# Patient Record
Sex: MALE | Race: WHITE | NOT HISPANIC OR LATINO
[De-identification: names, ages, dates, MRNs, and addresses within clinical notes are randomized per-mention and may not be internally consistent; named-entity substitution may affect disease eponyms.]

---

## 2017-01-03 ENCOUNTER — APPOINTMENT (OUTPATIENT)
Dept: ORTHOPEDIC SURGERY | Facility: CLINIC | Age: 42
End: 2017-01-03

## 2017-01-03 VITALS
TEMPERATURE: 98.4 F | SYSTOLIC BLOOD PRESSURE: 137 MMHG | HEIGHT: 68 IN | WEIGHT: 235 LBS | BODY MASS INDEX: 35.61 KG/M2 | DIASTOLIC BLOOD PRESSURE: 86 MMHG | HEART RATE: 93 BPM

## 2017-01-03 DIAGNOSIS — Z82.62 FAMILY HISTORY OF OSTEOPOROSIS: ICD-10-CM

## 2017-01-03 DIAGNOSIS — S43.401A UNSPECIFIED SPRAIN OF RIGHT SHOULDER JOINT, INITIAL ENCOUNTER: ICD-10-CM

## 2017-01-03 DIAGNOSIS — R73.03 PREDIABETES.: ICD-10-CM

## 2017-01-09 ENCOUNTER — FORM ENCOUNTER (OUTPATIENT)
Age: 42
End: 2017-01-09

## 2017-01-10 ENCOUNTER — OUTPATIENT (OUTPATIENT)
Dept: OUTPATIENT SERVICES | Facility: HOSPITAL | Age: 42
LOS: 1 days | End: 2017-01-10

## 2017-01-10 ENCOUNTER — APPOINTMENT (OUTPATIENT)
Dept: MRI IMAGING | Facility: CLINIC | Age: 42
End: 2017-01-10

## 2017-01-10 DIAGNOSIS — Z00.8 ENCOUNTER FOR OTHER GENERAL EXAMINATION: ICD-10-CM

## 2017-06-18 ENCOUNTER — EMERGENCY (EMERGENCY)
Facility: HOSPITAL | Age: 42
LOS: 1 days | Discharge: HOME | End: 2017-06-18
Attending: EMERGENCY MEDICINE
Payer: MEDICAID

## 2017-06-18 ENCOUNTER — EMERGENCY (EMERGENCY)
Facility: HOSPITAL | Age: 42
LOS: 1 days | Discharge: HOME | End: 2017-06-18

## 2017-06-18 VITALS
OXYGEN SATURATION: 97 % | DIASTOLIC BLOOD PRESSURE: 96 MMHG | SYSTOLIC BLOOD PRESSURE: 146 MMHG | WEIGHT: 229.94 LBS | TEMPERATURE: 99 F | RESPIRATION RATE: 20 BRPM | HEIGHT: 69 IN | HEART RATE: 97 BPM

## 2017-06-18 PROCEDURE — 99283 EMERGENCY DEPT VISIT LOW MDM: CPT

## 2017-06-18 RX ORDER — HYDROCORTISONE 1 %
1 OINTMENT (GRAM) TOPICAL
Qty: 1 | Refills: 0 | OUTPATIENT
Start: 2017-06-18 | End: 2017-07-02

## 2017-06-18 RX ADMIN — Medication 60 MILLIGRAM(S): at 14:49

## 2017-06-18 NOTE — ED STATDOCS - MEDICAL DECISION MAKING DETAILS
Will treat pt for superficial dermitis. Pt was also explained that this may be a fungal infection. Pt will f/u with dermatologist.

## 2017-06-18 NOTE — ED STATDOCS - GENITOURINARY, MLM
normal... Pelvic exam: Erythema noted to a small amount of the scrotum and skin excoriation. Erythema does not extend to the perineal. No penile discharge. Small erythematous non-vesicular papular lesion on the foreskin. Chaperone: Sadia Oh (scribe)

## 2017-06-18 NOTE — ED STATDOCS - OBJECTIVE STATEMENT
40 y/o M presents to ED c/o redness over R eyelid and lips x3 days. Pt states the rash spread to his groin and penis as well. Pt notes inflammation on his R eyelid and "little bumps" on his skin that are itchy. Pt has been going to the gym until about 3 days ago but reports engaging in rigorous physical activity in his home recently causing sweating within the last 3 days. Pt attempted relief with Fluticasone on the first day his sx onset to no relief. He states he put Mupirocin a few hours ago which helped to minimally resolve itching. He has had a hx of similar sx with a fungal infection on his foreskin and was Rx Mupirocin and a hx of cellulitis on his armpit. Pt states he has not seen his dermatologist for his current sx. Denies hx of STD/STI, fever, chills, pain or any other complaints at this time. Pt is pre-diabetic and pre-hypertensive.

## 2018-01-01 ENCOUNTER — EMERGENCY (EMERGENCY)
Facility: HOSPITAL | Age: 43
LOS: 1 days | Discharge: HOME | End: 2018-01-01
Attending: EMERGENCY MEDICINE
Payer: MEDICAID

## 2018-01-01 VITALS
HEIGHT: 68 IN | RESPIRATION RATE: 16 BRPM | OXYGEN SATURATION: 96 % | DIASTOLIC BLOOD PRESSURE: 84 MMHG | SYSTOLIC BLOOD PRESSURE: 154 MMHG | WEIGHT: 229.94 LBS | TEMPERATURE: 100 F | HEART RATE: 100 BPM

## 2018-01-01 PROCEDURE — 99282 EMERGENCY DEPT VISIT SF MDM: CPT

## 2018-01-01 PROCEDURE — 99283 EMERGENCY DEPT VISIT LOW MDM: CPT

## 2018-01-01 NOTE — ED ADULT TRIAGE NOTE - CHIEF COMPLAINT QUOTE
pt reports fever/body aches. father had flu and was on tamiflu. states he took 2 doses of his dads tamiflu.

## 2018-01-01 NOTE — ED STATDOCS - OBJECTIVE STATEMENT
42 year old male presenting to the ED complaining of a fever. He states that he developed a fever of 102F. Pt states that his father was recently dx the flu and the pt feels that he may have caught it from him. He states that he began to take the Tamiflu that was prescribed for his father, which he has been taking for 2 days. He denies having any abdominal pain, nausea, or decreased appetite. Pt states that he has PMHx of DM and is not on medication. No further complaints at this time.

## 2018-07-22 PROBLEM — R73.03 PREDIABETES: Status: RESOLVED | Noted: 2017-01-03 | Resolved: 2018-07-22

## 2019-01-09 ENCOUNTER — EMERGENCY (EMERGENCY)
Facility: HOSPITAL | Age: 44
LOS: 1 days | Discharge: HOME | End: 2019-01-09
Attending: EMERGENCY MEDICINE
Payer: MEDICAID

## 2019-01-09 VITALS
SYSTOLIC BLOOD PRESSURE: 155 MMHG | WEIGHT: 214.95 LBS | HEIGHT: 68 IN | TEMPERATURE: 99 F | OXYGEN SATURATION: 97 % | RESPIRATION RATE: 18 BRPM | DIASTOLIC BLOOD PRESSURE: 106 MMHG | HEART RATE: 124 BPM

## 2019-01-09 VITALS
DIASTOLIC BLOOD PRESSURE: 65 MMHG | TEMPERATURE: 98 F | RESPIRATION RATE: 16 BRPM | OXYGEN SATURATION: 98 % | SYSTOLIC BLOOD PRESSURE: 134 MMHG | HEART RATE: 80 BPM

## 2019-01-09 PROBLEM — E11.9 TYPE 2 DIABETES MELLITUS WITHOUT COMPLICATIONS: Chronic | Status: ACTIVE | Noted: 2018-01-01

## 2019-01-09 LAB
ALBUMIN SERPL ELPH-MCNC: 4.4 G/DL — SIGNIFICANT CHANGE UP (ref 3.3–5.2)
ALP SERPL-CCNC: 90 U/L — SIGNIFICANT CHANGE UP (ref 40–120)
ALT FLD-CCNC: 53 U/L — HIGH
ANION GAP SERPL CALC-SCNC: 17 MMOL/L — SIGNIFICANT CHANGE UP (ref 5–17)
APTT BLD: 30.9 SEC — SIGNIFICANT CHANGE UP (ref 27.5–36.3)
AST SERPL-CCNC: 37 U/L — SIGNIFICANT CHANGE UP
BASOPHILS # BLD AUTO: 0 K/UL — SIGNIFICANT CHANGE UP (ref 0–0.2)
BASOPHILS NFR BLD AUTO: 0.7 % — SIGNIFICANT CHANGE UP (ref 0–2)
BILIRUB SERPL-MCNC: 1.3 MG/DL — SIGNIFICANT CHANGE UP (ref 0.4–2)
BUN SERPL-MCNC: 12 MG/DL — SIGNIFICANT CHANGE UP (ref 8–20)
CALCIUM SERPL-MCNC: 8.7 MG/DL — SIGNIFICANT CHANGE UP (ref 8.6–10.2)
CHLORIDE SERPL-SCNC: 92 MMOL/L — LOW (ref 98–107)
CO2 SERPL-SCNC: 21 MMOL/L — LOW (ref 22–29)
CREAT SERPL-MCNC: 0.65 MG/DL — SIGNIFICANT CHANGE UP (ref 0.5–1.3)
EOSINOPHIL # BLD AUTO: 0.2 K/UL — SIGNIFICANT CHANGE UP (ref 0–0.5)
EOSINOPHIL NFR BLD AUTO: 3.4 % — SIGNIFICANT CHANGE UP (ref 0–5)
GLUCOSE SERPL-MCNC: 396 MG/DL — HIGH (ref 70–115)
HCT VFR BLD CALC: 48.4 % — SIGNIFICANT CHANGE UP (ref 42–52)
HGB BLD-MCNC: 17 G/DL — SIGNIFICANT CHANGE UP (ref 14–18)
INR BLD: 0.9 RATIO — SIGNIFICANT CHANGE UP (ref 0.88–1.16)
LYMPHOCYTES # BLD AUTO: 1.3 K/UL — SIGNIFICANT CHANGE UP (ref 1–4.8)
LYMPHOCYTES # BLD AUTO: 29.9 % — SIGNIFICANT CHANGE UP (ref 20–55)
MCHC RBC-ENTMCNC: 31.1 PG — SIGNIFICANT CHANGE UP (ref 27–31)
MCHC RBC-ENTMCNC: 38 G/DL — SIGNIFICANT CHANGE UP (ref 32–36)
MCV RBC AUTO: 81.8 FL — SIGNIFICANT CHANGE UP (ref 80–94)
MONOCYTES # BLD AUTO: 0.6 K/UL — SIGNIFICANT CHANGE UP (ref 0–0.8)
MONOCYTES NFR BLD AUTO: 13.3 % — HIGH (ref 3–10)
NEUTROPHILS # BLD AUTO: 2.3 K/UL — SIGNIFICANT CHANGE UP (ref 1.8–8)
NEUTROPHILS NFR BLD AUTO: 52 % — SIGNIFICANT CHANGE UP (ref 37–73)
PLATELET # BLD AUTO: 182 K/UL — SIGNIFICANT CHANGE UP (ref 150–400)
POTASSIUM SERPL-MCNC: 3.7 MMOL/L — SIGNIFICANT CHANGE UP (ref 3.5–5.3)
POTASSIUM SERPL-SCNC: 3.7 MMOL/L — SIGNIFICANT CHANGE UP (ref 3.5–5.3)
PROT SERPL-MCNC: 6.8 G/DL — SIGNIFICANT CHANGE UP (ref 6.6–8.7)
PROTHROM AB SERPL-ACNC: 10.3 SEC — SIGNIFICANT CHANGE UP (ref 10–12.9)
RBC # BLD: 5.88 M/UL — SIGNIFICANT CHANGE UP (ref 4.6–6.2)
RBC # FLD: 13.2 % — SIGNIFICANT CHANGE UP (ref 11–15.6)
SODIUM SERPL-SCNC: 130 MMOL/L — LOW (ref 135–145)
TROPONIN T SERPL-MCNC: <0.01 NG/ML — SIGNIFICANT CHANGE UP (ref 0–0.06)
WBC # BLD: 4.4 K/UL — LOW (ref 4.8–10.8)
WBC # FLD AUTO: 4.4 K/UL — LOW (ref 4.8–10.8)

## 2019-01-09 PROCEDURE — 93005 ELECTROCARDIOGRAM TRACING: CPT

## 2019-01-09 PROCEDURE — 99281 EMR DPT VST MAYX REQ PHY/QHP: CPT | Mod: 25

## 2019-01-09 PROCEDURE — 70450 CT HEAD/BRAIN W/O DYE: CPT | Mod: 26,59

## 2019-01-09 PROCEDURE — 96374 THER/PROPH/DIAG INJ IV PUSH: CPT | Mod: XU

## 2019-01-09 PROCEDURE — 85730 THROMBOPLASTIN TIME PARTIAL: CPT

## 2019-01-09 PROCEDURE — 70551 MRI BRAIN STEM W/O DYE: CPT | Mod: 26

## 2019-01-09 PROCEDURE — 80053 COMPREHEN METABOLIC PANEL: CPT

## 2019-01-09 PROCEDURE — 70551 MRI BRAIN STEM W/O DYE: CPT

## 2019-01-09 PROCEDURE — 85027 COMPLETE CBC AUTOMATED: CPT

## 2019-01-09 PROCEDURE — 70496 CT ANGIOGRAPHY HEAD: CPT | Mod: 26

## 2019-01-09 PROCEDURE — 93010 ELECTROCARDIOGRAM REPORT: CPT

## 2019-01-09 PROCEDURE — 70496 CT ANGIOGRAPHY HEAD: CPT

## 2019-01-09 PROCEDURE — 84484 ASSAY OF TROPONIN QUANT: CPT

## 2019-01-09 PROCEDURE — 85610 PROTHROMBIN TIME: CPT

## 2019-01-09 PROCEDURE — 99291 CRITICAL CARE FIRST HOUR: CPT

## 2019-01-09 PROCEDURE — 70498 CT ANGIOGRAPHY NECK: CPT | Mod: 26

## 2019-01-09 PROCEDURE — 70498 CT ANGIOGRAPHY NECK: CPT

## 2019-01-09 PROCEDURE — 70450 CT HEAD/BRAIN W/O DYE: CPT

## 2019-01-09 PROCEDURE — 82962 GLUCOSE BLOOD TEST: CPT

## 2019-01-09 PROCEDURE — 36415 COLL VENOUS BLD VENIPUNCTURE: CPT

## 2019-01-09 RX ORDER — VALACYCLOVIR 500 MG/1
2 TABLET, FILM COATED ORAL
Qty: 42 | Refills: 0 | OUTPATIENT
Start: 2019-01-09 | End: 2019-01-15

## 2019-01-09 RX ORDER — PANTOPRAZOLE SODIUM 20 MG/1
1 TABLET, DELAYED RELEASE ORAL
Qty: 7 | Refills: 0 | OUTPATIENT
Start: 2019-01-09 | End: 2019-01-15

## 2019-01-09 RX ORDER — VALACYCLOVIR 500 MG/1
1000 TABLET, FILM COATED ORAL ONCE
Qty: 0 | Refills: 0 | Status: COMPLETED | OUTPATIENT
Start: 2019-01-09 | End: 2019-01-09

## 2019-01-09 RX ORDER — METOPROLOL TARTRATE 50 MG
5 TABLET ORAL ONCE
Qty: 0 | Refills: 0 | Status: COMPLETED | OUTPATIENT
Start: 2019-01-09 | End: 2019-01-09

## 2019-01-09 RX ADMIN — Medication 60 MILLIGRAM(S): at 16:38

## 2019-01-09 RX ADMIN — VALACYCLOVIR 1000 MILLIGRAM(S): 500 TABLET, FILM COATED ORAL at 16:38

## 2019-01-09 RX ADMIN — Medication 5 MILLIGRAM(S): at 11:42

## 2019-01-09 NOTE — ED PROVIDER NOTE - ENMT, MLM
Airway patent, Nasal mucosa clear. Mouth with normal mucosa. Throat has no vesicles, no oropharyngeal exudates and uvula is midline. Airway patent, Nasal mucosa clear. Mouth with normal mucosa. Throat has no vesicles, no oropharyngeal exudates and uvula is midline. TMs clear b/l with open comedome noted in the left ear canal, no drainage or surrounding cellulitis.

## 2019-01-09 NOTE — ED PROVIDER NOTE - ATTENDING CONTRIBUTION TO CARE
SEEN WITH RESIDENT. ORIGINALLY CODE STROKE HOWEVER HX AND SYMPTOM MORE C/W BELSS PALSY/ TELESTROKE MD BONNER THOUGHT SHE SAW SOMETHING IN THE MEDULLA. MR AND CTA HEAD/NECK PERFORMED. NO STROKE OR OTHER ABNORMALITY SEEN  PT STARTED ON STEROID, ANTIVIRAL, D/C TO NEURO FU AS OUTPT  REC TO TREAT DM AND HTN WITH PCP  AGREE WITH HX PE TX DISPO

## 2019-01-09 NOTE — ED PROVIDER NOTE - OBJECTIVE STATEMENT
43M with hx of DM and hypertriglyceridemia p/w left facial paralysis since yesterday morning. patient states sx started around his mouth and then this morning started to involve his eye lid. feels he is having difficulty moving the side of his mouth and swallowing a bit as well as closing his left eye. (+) recent left ear discomfort last week. denies any visual changes, slurred speech, fever, chills. does admit to not seeking treatment for his DM and HyperTG, trying to control with diet and exercise.

## 2019-01-09 NOTE — ED PROVIDER NOTE - PROGRESS NOTE DETAILS
POSSIBLE STROKE  DO CT SCAN WITHOUT LABS  RISK ELVO (POSSIBLE FATAL)GREATER THAN RISK KIDNEY DAMAGE Itzel: all imaging negative for cva. will treat for bells palsy. d/w patient. outpt neuro f/u.

## 2019-01-09 NOTE — ED ADULT NURSE NOTE - OBJECTIVE STATEMENT
PAtient c/o of waking up 1/8/19 with a headache ,difficulty smiling , facial droop to left side and dry mouth, pt stated that he did not seek medical attention yesterday because "he felt it would get better"

## 2019-01-09 NOTE — ED PROVIDER NOTE - MEDICAL DECISION MAKING DETAILS
43M with left facial droop since yesterday. code stroke called and stat head ct preformed. code stroke cancelled secondary to time window. tele neuro eval

## 2019-01-09 NOTE — ED ADULT TRIAGE NOTE - CHIEF COMPLAINT QUOTE
Pt presents to ED for eval of left sided facial droop since yesterday at 8am. Pt states that he began having numbness and then proceeded to inability to close his left eye. COde stroke called after eval by Dr. Sanchez.

## 2019-01-09 NOTE — ED ADULT NURSE NOTE - NSIMPLEMENTINTERV_GEN_ALL_ED
Implemented All Universal Safety Interventions:  [place] to call system. Call bell, personal items and telephone within reach. Instruct patient to call for assistance. Room bathroom lighting operational. Non-slip footwear when patient is off stretcher. Physically safe environment: no spills, clutter or unnecessary equipment. Stretcher in lowest position, wheels locked, appropriate side rails in place.

## 2019-01-09 NOTE — ED ADULT NURSE REASSESSMENT NOTE - NS ED NURSE REASSESS COMMENT FT1
Patient remains pending results of MRI aware of NPO status, currently comfortable denies c/o of discomfort at this time vital signs remain stable NIH remains 1; POC reinforced with patient will continue to monitor patient

## 2019-01-09 NOTE — ED PROVIDER NOTE - CARE PROVIDER_API CALL
Darron Altman (MD; PhD), Clinical Neurophysiology; Neurology  [address] [place] [address]  Phone: [phone]  Fax: [phone]

## 2019-01-09 NOTE — ED STATDOCS - NS_ ATTENDINGSCRIBEDETAILS _ED_A_ED_FT
I, Oneal Sanchez, performed the initial face to face bedside interview with this patient regarding history of present illness, review of symptoms and relevant past medical, social and family history.  I completed an independent physical examination.    The history, relevant review of systems, past medical and surgical history, medical decision making, and physical examination was documented by the scribe in my presence and I attest to the accuracy of the documentation.

## 2019-01-09 NOTE — ED PROVIDER NOTE - CRITICAL CARE PROVIDED
60 MINUTES/direct patient care (not related to procedure)/additional history taking/interpretation of diagnostic studies/consultation with other physicians/documentation

## 2019-11-04 NOTE — ED PROVIDER NOTE - NS ED ATTENDING STATEMENT MOD
Patient is requesting a call back. He states that he is having some issues with his foot.     Thank you    Attending Only I have personally seen and examined this patient.  I have fully participated in the care of this patient. I have reviewed all pertinent clinical information, including history, physical exam, plan and the Resident’s note and agree except as noted.

## 2021-07-20 ENCOUNTER — NON-APPOINTMENT (OUTPATIENT)
Age: 46
End: 2021-07-20

## 2021-07-20 ENCOUNTER — APPOINTMENT (OUTPATIENT)
Dept: ORTHOPEDIC SURGERY | Facility: CLINIC | Age: 46
End: 2021-07-20
Payer: MEDICAID

## 2021-07-20 PROCEDURE — 99204 OFFICE O/P NEW MOD 45 MIN: CPT

## 2021-07-23 NOTE — ADDENDUM
[FreeTextEntry1] : Documented by Rogerio Vega acting as a scribe for Dr. Ignacio on 07/20/2021. \par \par All medical record entries made by the Scribe were at my, Dr. Ignacio's, direction and\par personally dictated by me on 07/20/2021. I have reviewed the chart and agree that the record\par accurately reflects my personal performance of the history, physical exam, procedure and imaging.

## 2021-07-23 NOTE — HISTORY OF PRESENT ILLNESS
[Stable] : stable [___ yrs] : [unfilled] year(s) ago [2] : a current pain level of 2/10 [3] : an average pain level of 3/10 [Lifting] : worsened by lifting [de-identified] : RANDALL MARIA is a 45 year male being seen for initial visit R forearm pain. He reports SHARON as performing incline bench presses at the gym when the rep got too heavy and he set the weight down forward instead of backward. He visited [facility] after injury, where they provided him with wrong brace. Then after he was called back and provided with right brace, and mentioned cortisone injection. However, due to COVID he wasn’t able to get appointment. He denies any mechanical symptoms such as clicking or popping. He does endorses tightness and mild pain with flexion and extension.

## 2021-07-23 NOTE — PHYSICAL EXAM
[de-identified] : Physical Exam:\par General: Well appearing, no acute distress\par Neurologic: A&Ox3, No focal deficits\par Head: NCAT without abrasions, lacerations, or ecchymosis to head, face, or scalp\par Eyes: No scleral icterus, no gross abnormalities\par Respiratory: Equal chest wall expansion bilaterally, no accessory muscle use\par Lymphatic: No lymphadenopathy palpated\par Skin: Warm and dry\par Psychiatric: Normal mood and affect \par \par Right wrist exam\par Skin: Clean, dry, intact. No ecchymosis. No swelling. Palpable gap noted at extensor tendon compartment 3. No audible clicking. No crepitus. TTP extensor tendon. \par ROM: RIGHT  mild pain with pronation and no pain with supination,  LEFT full supination/pronation.\par Painful ROM: None\par Tenderness: No tenderness to palpation at the distal radius, distal ulna, the DRUJ. No pain at the scapholunate interval. No snuffbox pain.\par Strength: 4/5 wrist flexion, 4/5 wrist extension, 5/5 supination, 4/5 pronation\par Stability: Stable DRUJ \par Vasc: 2+ radial pulse, <2s cap refill\par Sensation: In tact to light touch throughout\par Neuro: Negative tinels over median nerve, AIN/PIN/Ulnar nerve in tact to motor/sensation.

## 2021-07-23 NOTE — DISCUSSION/SUMMARY
[de-identified] : RANDALL MARIA is a 45 year male being seen for initial visit R forearm pain. He reports SHARON as performing incline bench presses at the gym when the rep got too heavy and he set the weight down forward instead of backward. He visited [facility] after injury, where they provided him with wrong brace. Then after he was called back and provided with right brace, and mentioned cortisone injection. However, due to COVID he wasn’t able to get appointment. He denies any mechanical symptoms such as clicking or popping. He does endorses tightness and mild pain with flexion and extension.\par \par We had a thorough discussion regarding the nature of his pain, the pathophysiology, as well as all treatment options. Considering the patient's current presentation of pain being worse than prior and failing on greater than 3 months of conservative measures, an MRI is indicated at this time. A prescription for this was given to the patient. We will go over the MRI results with him upon obtaining the results in the office and advise him of further treatment options or refer him to Dr. Lewis. He agrees with the above plan and all questions were answered.

## 2021-07-29 ENCOUNTER — APPOINTMENT (OUTPATIENT)
Dept: MRI IMAGING | Facility: CLINIC | Age: 46
End: 2021-07-29
Payer: MEDICAID

## 2021-07-29 ENCOUNTER — OUTPATIENT (OUTPATIENT)
Dept: OUTPATIENT SERVICES | Facility: HOSPITAL | Age: 46
LOS: 1 days | End: 2021-07-29

## 2021-07-29 DIAGNOSIS — S66.212S: ICD-10-CM

## 2021-07-29 PROCEDURE — 73218 MRI UPPER EXTREMITY W/O DYE: CPT | Mod: 26,RT

## 2021-07-29 PROCEDURE — 73221 MRI JOINT UPR EXTREM W/O DYE: CPT | Mod: 26,RT

## 2021-08-06 ENCOUNTER — NON-APPOINTMENT (OUTPATIENT)
Age: 46
End: 2021-08-06

## 2021-08-09 ENCOUNTER — APPOINTMENT (OUTPATIENT)
Dept: ORTHOPEDIC SURGERY | Facility: CLINIC | Age: 46
End: 2021-08-09
Payer: MEDICAID

## 2021-08-09 ENCOUNTER — APPOINTMENT (OUTPATIENT)
Dept: ORTHOPEDIC SURGERY | Facility: CLINIC | Age: 46
End: 2021-08-09

## 2021-08-09 VITALS
HEART RATE: 106 BPM | BODY MASS INDEX: 31.52 KG/M2 | SYSTOLIC BLOOD PRESSURE: 165 MMHG | HEIGHT: 68 IN | WEIGHT: 208 LBS | DIASTOLIC BLOOD PRESSURE: 97 MMHG

## 2021-08-09 DIAGNOSIS — S66.212S: ICD-10-CM

## 2021-08-09 DIAGNOSIS — M25.511 PAIN IN RIGHT SHOULDER: ICD-10-CM

## 2021-08-09 PROCEDURE — 99214 OFFICE O/P EST MOD 30 MIN: CPT | Mod: 25

## 2021-08-09 PROCEDURE — 20610 DRAIN/INJ JOINT/BURSA W/O US: CPT | Mod: RT

## 2021-08-09 NOTE — ADDENDUM
[FreeTextEntry1] : Documented by Rogerio Vega acting as a scribe for Dr. Ignacio on 08/09/2021. \par \par All medical record entries made by the Scribe were at my, Dr. Ignacio's, direction and\par personally dictated by me on 08/09/2021. I have reviewed the chart and agree that the record\par accurately reflects my personal performance of the history, physical exam, procedure and imaging.

## 2021-08-09 NOTE — DISCUSSION/SUMMARY
[de-identified] : RANDALL MARIA is a 45 year male being seen for f/u visit R forearm pain. He presents for MRI review of R forearm and wrist, and is requesting cortisone injection. \par \par We had a thorough discussion regarding the nature of his pain, the pathophysiology, as well as all treatment options. Pt due to his acute pain elected for a corticosteroid injection at today's visit and tolerated the procedure well. He should take it easy for the next 2-3 days while icing the joint. Patient will follow up in 6-8 wks for repeat clinical assessment. If refractory, we will refer him to Dr. Lewis to explore further treatment options. All questions were answered and the patient verbalized understanding. The patient is in agreement with this treatment plan.

## 2021-08-09 NOTE — PROCEDURE
[de-identified] : Injection: Right forearm \par Indication: Pain \par \par A discussion was had with the patient regarding this procedure and all questions were answered. All risks, benefits and alternatives were discussed. These included but were not limited to bleeding, infection, and allergic reaction. Alcohol was used to clean the skin, and betadine was used to sterilize and prep the area in the anterior aspect of the right forearm. Ethyl chloride spray was then used as a topical anesthetic. A 22-gauge needle was used to inject 3cc 1% xylocaine, 2cc 0.25% bupivacaine and 1cc of 40mg/mL triamcinolone acetonide into the right forearm. A sterile bandage was then applied. The patient tolerated the procedure well and there were no complications. \par

## 2021-08-09 NOTE — HISTORY OF PRESENT ILLNESS
[Stable] : stable [___ yrs] : [unfilled] year(s) ago [2] : a current pain level of 2/10 [3] : an average pain level of 3/10 [Lifting] : worsened by lifting [de-identified] : RANDALL MARIA is a 45 year male being seen for f/u visit R forearm pain. He presents for MRI review of R forearm and wrist, and is requesting cortisone injection. \par \par MRI of R forearm reveals:\par \par *  Longitudinal split tear of the extensor tendon within the fourth extensor compartment extending towards the index finger. Suggestion of low-grade intrasubstance tearing of the extensor tendons within the fourth extensor compartment extending towards the ring and little finger. No full-thickness tear.\par *  Small amount of fluid within the medial aspect of the flexor retinaculum. No flexor tendon tear.\par *  High-grade partial-thickness tear of the origin of the common extensor tendon at the level of the elbow.\par *  Nonspecific edema within the proximal radial shaft, which may represent stress reaction. Biceps insertion is unremarkable.\par *  Focal perforation of the radial attachment of the triangular fibrocartilage complex.\par \par

## 2021-08-09 NOTE — PHYSICAL EXAM
[de-identified] : Physical Exam:\par General: Well appearing, no acute distress\par Neurologic: A&Ox3, No focal deficits\par Head: NCAT without abrasions, lacerations, or ecchymosis to head, face, or scalp\par Eyes: No scleral icterus, no gross abnormalities\par Respiratory: Equal chest wall expansion bilaterally, no accessory muscle use\par Lymphatic: No lymphadenopathy palpated\par Skin: Warm and dry\par Psychiatric: Normal mood and affect \par \par Right wrist exam\par Skin: Clean, dry, intact. No ecchymosis. No swelling. Palpable gap noted at extensor tendon compartment 3. No audible clicking. No crepitus. TTP extensor tendon. \par ROM: RIGHT  mild pain with pronation and no pain with supination,  LEFT full supination/pronation.\par Painful ROM: None\par Tenderness: No tenderness to palpation at the distal radius, distal ulna, the DRUJ. No pain at the scapholunate interval. No snuffbox pain.\par Strength: 4/5 wrist flexion, 4/5 wrist extension, 5/5 supination, 4/5 pronation\par Stability: Stable DRUJ \par Vasc: 2+ radial pulse, <2s cap refill\par Sensation: In tact to light touch throughout\par Neuro: Negative tinels over median nerve, AIN/PIN/Ulnar nerve in tact to motor/sensation.  [de-identified] : EXAM:  MR WRIST RT\par PROCEDURE DATE:  07/29/2021\par \par IMPRESSION:\par *  Longitudinal split tear of the extensor tendon within the fourth extensor compartment extending towards the index finger. Suggestion of low-grade intrasubstance tearing of the extensor tendons within the fourth extensor compartment extending towards the ring and little finger. No full-thickness tear.\par *  Small amount of fluid within the medial aspect of the flexor retinaculum. No flexor tendon tear.\par *  High-grade partial-thickness tear of the origin of the common extensor tendon at the level of the elbow.\par *  Nonspecific edema within the proximal radial shaft, which may represent stress reaction. Biceps insertion is unremarkable.\par *  Focal perforation of the radial attachment of the triangular fibrocartilage complex.\par \par

## 2022-08-18 NOTE — ED ADULT NURSE REASSESSMENT NOTE - NIH STROKE SCALE RESULT
1 week PO: Patient is doing well post-operatively. The importance of post-op drop compliance was emphasized. Drop scheduled reviewed with patient. Patient to call if any visual changes or concerns. 1-4 (minor stroke)

## 2023-01-03 ENCOUNTER — EMERGENCY (EMERGENCY)
Facility: HOSPITAL | Age: 48
LOS: 1 days | Discharge: HOME | End: 2023-01-03
Attending: STUDENT IN AN ORGANIZED HEALTH CARE EDUCATION/TRAINING PROGRAM
Payer: MEDICAID

## 2023-01-03 VITALS
TEMPERATURE: 98 F | HEART RATE: 103 BPM | WEIGHT: 210.1 LBS | SYSTOLIC BLOOD PRESSURE: 190 MMHG | HEIGHT: 68 IN | RESPIRATION RATE: 18 BRPM | OXYGEN SATURATION: 99 % | DIASTOLIC BLOOD PRESSURE: 110 MMHG

## 2023-01-03 PROCEDURE — 99283 EMERGENCY DEPT VISIT LOW MDM: CPT

## 2023-01-03 PROCEDURE — 73610 X-RAY EXAM OF ANKLE: CPT

## 2023-01-03 PROCEDURE — 99284 EMERGENCY DEPT VISIT MOD MDM: CPT

## 2023-01-03 PROCEDURE — 73610 X-RAY EXAM OF ANKLE: CPT | Mod: 26,LT

## 2023-01-03 NOTE — ED PROVIDER NOTE - MUSCULOSKELETAL MINIMAL EXAM
left ankle medial minimal ttp. limited rom due to club feet. Scan from prior surgery c/d/i. No signs of infection, redness, swelling. no point ttp.

## 2023-01-03 NOTE — ED ADULT NURSE NOTE - OBJECTIVE STATEMENT
Patient is alert and oriented X4 from home. Patient with c/o left foot pain. PAtient states he was doing exercise & felt a pain

## 2023-01-03 NOTE — ED PROVIDER NOTE - OBJECTIVE STATEMENT
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 Pt states he was stretching at home 5-6 days ago and the next day felt pain at the left medial ankle. Pt states pain only when ambulating or putting weight on the foot.

## 2023-01-03 NOTE — ED PROVIDER NOTE - ATTENDING APP SHARED VISIT CONTRIBUTION OF CARE
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 Denies direct trauma. Pain to medial ankle. states pain only when ambulating or putting weight on the foot.  AP - no direct trauma. no obvious fx. minimal swelling noted. has podiatry outpt f/u

## 2023-01-03 NOTE — ED PROVIDER NOTE - PATIENT PORTAL LINK FT
You can access the FollowMyHealth Patient Portal offered by [facility] by registering at the following website: [email or web address]. By joining [employer]’s FollowMyHealth portal, you will also be able to view your health information using other applications (apps) compatible with our system.

## 2023-01-03 NOTE — ED PROVIDER NOTE - CLINICAL SUMMARY MEDICAL DECISION MAKING FREE TEXT BOX
47M with club feet s/p corrective surgery presenting with left ankle pain after stretching at home 5 days ago. On exam minimal ttp medically. limited ROM, per pt this is his normal ROM due to club feet. XRs ordered.   pt has a podiatrist he follows with. 47M with club feet s/p corrective surgery presenting with left ankle pain after stretching at home 5 days ago. On exam minimal ttp medically. limited ROM, per pt this is his normal ROM due to club feet. XRs ordered without obvious fx. Pt has a podiatrist he follows with. Told to FU with pcp and podiatrist. Given strict return precautions.

## 2023-01-03 NOTE — ED PROVIDER NOTE - NSFOLLOWUPINSTRUCTIONS_ED_ALL_ED_FT
Follow up with podiatrist within 1 week.   Return for any worsening symptoms.   Motrin and Tylenol for any pain.

## 2023-01-03 NOTE — ED ADULT TRIAGE NOTE - CHIEF COMPLAINT QUOTE
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

## 2023-05-31 NOTE — ED STATDOCS - PROGRESS NOTE DETAILS
PHYSICAL THERAPY EVALUATION  Type of Visit: Evaluation    See electronic medical record for Abuse and Falls Screening details.    Subjective      Presenting condition or subjective complaint: Reaching overhead and balance issues  Date of onset: 05/19/23    Relevant medical history: High blood pressure   Dates & types of surgery: L total knee 2012; R patella tendon rupture 2007    Prior diagnostic imaging/testing results: X-ray     Prior therapy history for the same diagnosis, illness or injury: Yes      Prior Level of Function   Transfers: Independent  Ambulation: Independent  ADL: Independent  IADL:     Living Environment  Social support: With a significant other or spouse   Type of home: 2-story   Stairs to enter the home: Yes 2 Is there a railing: No   Ramp: Yes   Stairs inside the home: Yes 18 Is there a railing: Yes   Help at home: None  Equipment owned:       Employment: No    Hobbies/Interests: golf, yard work, woodworking    Patient goals for therapy: Doing tasks overhead    Pain assessment: Pain denied     Objective   Cognitive Status Examination  Orientation: Oriented to person, place and time   Level of Consciousness: Alert  Follows Commands and Answers Questions: 100% of the time  Personal Safety and Judgement: Intact  Memory: Intact    OBSERVATION:   INTEGUMENTARY:   POSTURE: Forward head posture and increased kyphosis   PALPATION:   RANGE OF MOTION:   STRENGTH:   Pain: - none + mild ++ moderate +++ severe  Strength Scale: 0-5/5 Left Right   Hip Flexion 4 4   Hip Extension 4- 3+   Hip Abduction 4 4   Hip Adduction     Hip Internal Rotation     Hip External Rotation     Knee Flexion 4+ 4+   Knee Extension 4+ 4+     Pain: - none + mild ++ moderate +++ severe  Strength Scale: 0-5/5 Left Right   Shoulder Flexion 4+ 4   Shoulder Extension 5- 5-   Shoulder Abduction 4- 4   Shoulder Adduction     Shoulder Internal Rotation 4 4   Shoulder External Rotation 4 4   Shoulder Horizontal Abduction     Shoulder  Horizontal Adduction     Elbow Flexion     Elbow Extension     Mid Trap 4- 4-   Lower Trap 4- 4-   Rhomboid     Serratus Anterior         BED MOBILITY:     TRANSFERS:     WHEELCHAIR MOBILITY:     GAIT:   Level of [place]:   Assistive Device(s):   Gait Deviations:   Gait Distance:  Stairs:     BALANCE:         SENSATION:     REFLEXES:   COORDINATION:   MUSCLE TONE:       CERVICAL SCREEN:   (Degrees) Left AROM Right AROM   Cervical Flexion Min loss    Cervical Extension Major loss   Side bend Major loss Major loss    Rotation Major loss  Major loss    Thoracic Flexion    Thoracic Extension    Thoracic Rotation     Thoracic Outlet Screen (Marta, Adson)        Left Right   Alar Ligament    Cervical Flexion-Rotation     Cervical Rot/Lateral Flex     Compression     Distraction     Spurling s     Thoracic Outlet Screen (Marta, Adson)     Transverse Ligament     Vertebral Artery           Assessment & Plan   CLINICAL IMPRESSIONS   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  The following significant findings have been identified: Decreased ROM/flexibility, Decreased joint mobility, Decreased strength and Impaired balance. These impairments interfere with their ability to perform self care tasks, recreational activities, household chores and driving  as compared to previous level of function.     Clinical Decision Making (Complexity):   Clinical Presentation: Stable/Uncomplicated  Clinical Presentation Rationale: based on medical and personal factors listed in PT evaluation  Clinical Decision Making (Complexity): Low complexity    PLAN OF CARE  Treatment Interventions:  Interventions: Manual Therapy, Neuromuscular Re-education, Therapeutic Activity, Therapeutic Exercise    Long Term Goals      PT Goal 1  Goal Identifier: 1  Goal Description: Patient will normalize Cervical ROM in order to demonstrate improved impairments.  Target Date: 06/28/23  PT Goal 2  Goal Identifier: 2  Goal Description: Patient will tolerate grabbing boxes off the top pantry shelf with no increase in symptoms in order to demonstrate improved function.  Target Date: 07/26/23  PT Goal 3  Goal Identifier: 3  Goal Description: Paitent will improve FGA from 18/30 to 25/30 or better in order to demonstrate improved impairments.  Target Date: 08/23/23      Frequency of Treatment: 1-2x/wk  Duration of Treatment: 12 weeks    Recommended Referrals to Other Professionals:   Education Assessment:   Learner/Method: Patient    Risks and benefits of evaluation/treatment have been explained.   Patient/Family/caregiver agrees with Plan of Care.     Evaluation Time:     PT Eval, Low Complexity Minutes (61106): 20      Signing Clinician: FIDELINA Alvarado [facility]                                                                                   OUTPATIENT PHYSICAL THERAPY      PLAN OF TREATMENT FOR OUTPATIENT REHABILITATION   Patient's Last Name, First Name, Enrike Metz YOB: 1941   Provider's [facility]   [facility]   Medical Record No.  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             Physician Signature                43 y,o M with PMHx DM and HLD presents to ED c/o left side facial droop and numbness and difficulty swallowing since yesterday at 0800. Symptoms started with some difficulty swallowing and left facial droop and progressed to worsening numbness. Pt not on medication for DM, trying to control symptoms with diet. Code stroke called at 11:14 after evaluating the patient.  Will send to priority CT followed by Main ED for further evaluation of symptoms. Date    X_____________________________________________________                        Referring Provider:  Enrike Love      Initial Assessment  See Epic Evaluation- Start of Care Date: 05/31/23

## 2024-04-15 NOTE — ED ADULT TRIAGE NOTE - MODE OF ARRIVAL
Walk in Medical Necessity Information: It is in your best interest to select a reason for this procedure from the list below. All of these items fulfill various CMS LCD requirements except the new and changing color options. Medical Necessity Clause: This procedure was medically necessary because the lesion that was treated was: Lab: [id number] Lab Facility: [id number] Detail Level: Detailed Was A Bandage Applied: Yes Size Of Lesion In Cm (Required): 1.1 X Size Of Lesion In Cm (Optional): 0 Depth Of Shave: dermis Biopsy Method: Dermablade Anesthesia Type: 2% lidocaine with epinephrine Anesthesia Volume In Cc: 1 Hemostasis: Electrocautery Wound Care: Bacitracin Path Notes (To The Dermatopathologist): Size: 1.1cm Consent was obtained from the patient. The risks and benefits to therapy were discussed in detail. Specifically, the risks of infection, scarring, bleeding, prolonged wound healing, incomplete removal, allergy to anesthesia, nerve injury and recurrence were addressed. Prior to the procedure, the treatment site was clearly identified and confirmed by the patient. All components of Universal Protocol/PAUSE Rule completed. Render Post-Care Instructions In Note?: no Post-Care Instructions: I reviewed with the patient in detail post-care instructions. Patient is to keep the biopsy site dry overnight, and then apply bacitracin twice daily until healed. Patient may apply hydrogen peroxide soaks to remove any crusting. Notification Instructions: Patient will be notified of pathology results. However, patient instructed to call the office if not contacted within 2 weeks. Billing Type: Third-Party Bill

## 2024-05-08 ENCOUNTER — EMERGENCY (EMERGENCY)
Facility: HOSPITAL | Age: 49
LOS: 1 days | Discharge: HOME | End: 2024-05-08
Attending: STUDENT IN AN ORGANIZED HEALTH CARE EDUCATION/TRAINING PROGRAM
Payer: MEDICAID

## 2024-05-08 VITALS
TEMPERATURE: 98 F | SYSTOLIC BLOOD PRESSURE: 168 MMHG | HEART RATE: 96 BPM | WEIGHT: 210.76 LBS | RESPIRATION RATE: 18 BRPM | DIASTOLIC BLOOD PRESSURE: 98 MMHG | OXYGEN SATURATION: 99 %

## 2024-05-08 VITALS
SYSTOLIC BLOOD PRESSURE: 135 MMHG | OXYGEN SATURATION: 97 % | RESPIRATION RATE: 16 BRPM | HEART RATE: 85 BPM | TEMPERATURE: 99 F | DIASTOLIC BLOOD PRESSURE: 90 MMHG

## 2024-05-08 LAB
A1C WITH ESTIMATED AVERAGE GLUCOSE RESULT: 10 % — HIGH (ref 4–5.6)
ACETONE SERPL-MCNC: NEGATIVE — SIGNIFICANT CHANGE UP
ALBUMIN SERPL ELPH-MCNC: 4.2 G/DL — SIGNIFICANT CHANGE UP (ref 3.3–5.2)
ALP SERPL-CCNC: 70 U/L — SIGNIFICANT CHANGE UP (ref 40–120)
ALT FLD-CCNC: 24 U/L — SIGNIFICANT CHANGE UP
ANION GAP SERPL CALC-SCNC: 10 MMOL/L — SIGNIFICANT CHANGE UP (ref 5–17)
AST SERPL-CCNC: 20 U/L — SIGNIFICANT CHANGE UP
BASE EXCESS BLDV CALC-SCNC: 4.8 MMOL/L — HIGH (ref -2–3)
BASOPHILS # BLD AUTO: 0.04 K/UL — SIGNIFICANT CHANGE UP (ref 0–0.2)
BASOPHILS NFR BLD AUTO: 0.7 % — SIGNIFICANT CHANGE UP (ref 0–2)
BILIRUB SERPL-MCNC: 1.4 MG/DL — SIGNIFICANT CHANGE UP (ref 0.4–2)
BUN SERPL-MCNC: 13.9 MG/DL — SIGNIFICANT CHANGE UP (ref 8–20)
CA-I SERPL-SCNC: 1.19 MMOL/L — SIGNIFICANT CHANGE UP (ref 1.15–1.33)
CALCIUM SERPL-MCNC: 9.1 MG/DL — SIGNIFICANT CHANGE UP (ref 8.4–10.5)
CHLORIDE BLDV-SCNC: 101 MMOL/L — SIGNIFICANT CHANGE UP (ref 96–108)
CHLORIDE SERPL-SCNC: 100 MMOL/L — SIGNIFICANT CHANGE UP (ref 96–108)
CO2 SERPL-SCNC: 28 MMOL/L — SIGNIFICANT CHANGE UP (ref 22–29)
CREAT SERPL-MCNC: 0.72 MG/DL — SIGNIFICANT CHANGE UP (ref 0.5–1.3)
EGFR: 113 ML/MIN/1.73M2 — SIGNIFICANT CHANGE UP
EOSINOPHIL # BLD AUTO: 0.13 K/UL — SIGNIFICANT CHANGE UP (ref 0–0.5)
EOSINOPHIL NFR BLD AUTO: 2.3 % — SIGNIFICANT CHANGE UP (ref 0–6)
ESTIMATED AVERAGE GLUCOSE: 240 MG/DL — HIGH (ref 68–114)
GAS PNL BLDV: 137 MMOL/L — SIGNIFICANT CHANGE UP (ref 136–145)
GAS PNL BLDV: SIGNIFICANT CHANGE UP
GLUCOSE BLDV-MCNC: 247 MG/DL — HIGH (ref 70–99)
GLUCOSE SERPL-MCNC: 247 MG/DL — HIGH (ref 70–99)
HCO3 BLDV-SCNC: 31 MMOL/L — HIGH (ref 22–29)
HCT VFR BLD CALC: 46.8 % — SIGNIFICANT CHANGE UP (ref 39–50)
HCT VFR BLDA CALC: 49 % — SIGNIFICANT CHANGE UP
HGB BLD CALC-MCNC: 16.2 G/DL — SIGNIFICANT CHANGE UP (ref 12.6–17.4)
HGB BLD-MCNC: 16.2 G/DL — SIGNIFICANT CHANGE UP (ref 13–17)
IMM GRANULOCYTES NFR BLD AUTO: 0.4 % — SIGNIFICANT CHANGE UP (ref 0–0.9)
LACTATE BLDV-MCNC: 1.9 MMOL/L — SIGNIFICANT CHANGE UP (ref 0.5–2)
LYMPHOCYTES # BLD AUTO: 2.07 K/UL — SIGNIFICANT CHANGE UP (ref 1–3.3)
LYMPHOCYTES # BLD AUTO: 37.4 % — SIGNIFICANT CHANGE UP (ref 13–44)
MAGNESIUM SERPL-MCNC: 1.9 MG/DL — SIGNIFICANT CHANGE UP (ref 1.8–2.6)
MCHC RBC-ENTMCNC: 28 PG — SIGNIFICANT CHANGE UP (ref 27–34)
MCHC RBC-ENTMCNC: 34.6 GM/DL — SIGNIFICANT CHANGE UP (ref 32–36)
MCV RBC AUTO: 81 FL — SIGNIFICANT CHANGE UP (ref 80–100)
MONOCYTES # BLD AUTO: 0.32 K/UL — SIGNIFICANT CHANGE UP (ref 0–0.9)
MONOCYTES NFR BLD AUTO: 5.8 % — SIGNIFICANT CHANGE UP (ref 2–14)
NEUTROPHILS # BLD AUTO: 2.96 K/UL — SIGNIFICANT CHANGE UP (ref 1.8–7.4)
NEUTROPHILS NFR BLD AUTO: 53.4 % — SIGNIFICANT CHANGE UP (ref 43–77)
PCO2 BLDV: 60 MMHG — HIGH (ref 42–55)
PH BLDV: 7.32 — SIGNIFICANT CHANGE UP (ref 7.32–7.43)
PLATELET # BLD AUTO: 140 K/UL — LOW (ref 150–400)
PO2 BLDV: <42 MMHG — SIGNIFICANT CHANGE UP (ref 25–45)
POTASSIUM BLDV-SCNC: 4 MMOL/L — SIGNIFICANT CHANGE UP (ref 3.5–5.1)
POTASSIUM SERPL-MCNC: 3.8 MMOL/L — SIGNIFICANT CHANGE UP (ref 3.5–5.3)
POTASSIUM SERPL-SCNC: 3.8 MMOL/L — SIGNIFICANT CHANGE UP (ref 3.5–5.3)
PROT SERPL-MCNC: 6.2 G/DL — LOW (ref 6.6–8.7)
RBC # BLD: 5.78 M/UL — SIGNIFICANT CHANGE UP (ref 4.2–5.8)
RBC # FLD: 12.7 % — SIGNIFICANT CHANGE UP (ref 10.3–14.5)
SAO2 % BLDV: 68.9 % — SIGNIFICANT CHANGE UP
SODIUM SERPL-SCNC: 138 MMOL/L — SIGNIFICANT CHANGE UP (ref 135–145)
TROPONIN T, HIGH SENSITIVITY RESULT: 25 NG/L — SIGNIFICANT CHANGE UP (ref 0–51)
TROPONIN T, HIGH SENSITIVITY RESULT: 29 NG/L — SIGNIFICANT CHANGE UP (ref 0–51)
WBC # BLD: 5.54 K/UL — SIGNIFICANT CHANGE UP (ref 3.8–10.5)
WBC # FLD AUTO: 5.54 K/UL — SIGNIFICANT CHANGE UP (ref 3.8–10.5)

## 2024-05-08 PROCEDURE — 82330 ASSAY OF CALCIUM: CPT

## 2024-05-08 PROCEDURE — 82009 KETONE BODYS QUAL: CPT

## 2024-05-08 PROCEDURE — 82435 ASSAY OF BLOOD CHLORIDE: CPT

## 2024-05-08 PROCEDURE — 99284 EMERGENCY DEPT VISIT MOD MDM: CPT

## 2024-05-08 PROCEDURE — 82803 BLOOD GASES ANY COMBINATION: CPT

## 2024-05-08 PROCEDURE — 84295 ASSAY OF SERUM SODIUM: CPT

## 2024-05-08 PROCEDURE — 85025 COMPLETE CBC W/AUTO DIFF WBC: CPT

## 2024-05-08 PROCEDURE — 83605 ASSAY OF LACTIC ACID: CPT

## 2024-05-08 PROCEDURE — 85014 HEMATOCRIT: CPT

## 2024-05-08 PROCEDURE — 36415 COLL VENOUS BLD VENIPUNCTURE: CPT

## 2024-05-08 PROCEDURE — 71045 X-RAY EXAM CHEST 1 VIEW: CPT

## 2024-05-08 PROCEDURE — 80053 COMPREHEN METABOLIC PANEL: CPT

## 2024-05-08 PROCEDURE — 93005 ELECTROCARDIOGRAM TRACING: CPT

## 2024-05-08 PROCEDURE — 82947 ASSAY GLUCOSE BLOOD QUANT: CPT

## 2024-05-08 PROCEDURE — 83735 ASSAY OF MAGNESIUM: CPT

## 2024-05-08 PROCEDURE — 84132 ASSAY OF SERUM POTASSIUM: CPT

## 2024-05-08 PROCEDURE — 71045 X-RAY EXAM CHEST 1 VIEW: CPT | Mod: 26

## 2024-05-08 PROCEDURE — 99285 EMERGENCY DEPT VISIT HI MDM: CPT | Mod: 25

## 2024-05-08 PROCEDURE — 82962 GLUCOSE BLOOD TEST: CPT

## 2024-05-08 PROCEDURE — 84484 ASSAY OF TROPONIN QUANT: CPT

## 2024-05-08 PROCEDURE — 93010 ELECTROCARDIOGRAM REPORT: CPT

## 2024-05-08 PROCEDURE — 85018 HEMOGLOBIN: CPT

## 2024-05-08 PROCEDURE — 83036 HEMOGLOBIN GLYCOSYLATED A1C: CPT

## 2024-05-08 RX ORDER — METFORMIN HYDROCHLORIDE 850 MG/1
1 TABLET ORAL
Qty: 30 | Refills: 0
Start: 2024-05-08 | End: 2024-06-06

## 2024-05-08 NOTE — ED ADULT TRIAGE NOTE - CHIEF COMPLAINT QUOTE
Pt report having toothache/swelling to face, mild chest pain, high blood pressure not on meds, High blood sugar but not controlled. Haven't been to a doctor in 2 years

## 2024-05-08 NOTE — ED PROVIDER NOTE - NSFOLLOWUPINSTRUCTIONS_ED_ALL_ED_FT
Your blood pressure was found to be elevated today and emergency department. You have been discharged because you have no symptoms to your high blood pressure.  If you should develop any symptoms including headache, chest pain, shortness of breath, like swelling, dizziness, or any other symptoms related to high blood pressure, return to the emergency department immediately.  YOU MUST see your doctor within 5 days.  High blood pressure puts you at risk for stroke and heart disease.  These things come on without warning and do not have symptoms before they happen. Therefore it is extremely important you address this with regular visits to your doctor.  If you do not have a doctor we will make an appointment for you.     Please follow up with Cardiology clinic at your scheduled appointment. You should receive a phone call with your appointment date at time in the next 1-2 days. If you do not receive a phone call please contact the number below to confirm your appointment details.    Please return to the ED for any new or concerning symptoms including but not limited to Chest pain, difficulty breathing, fever, abdominal pain, nausea, vomiting, diarrhea. Your blood pressure was found to be elevated today and emergency department. You have been discharged because you have no symptoms to your high blood pressure.  If you should develop any symptoms including headache, chest pain, shortness of breath, like swelling, dizziness, or any other symptoms related to high blood pressure, return to the emergency department immediately.  YOU MUST see your doctor within 5 days.  High blood pressure puts you at risk for stroke and heart disease.  These things come on without warning and do not have symptoms before they happen. Therefore it is extremely important you address this with regular visits to your doctor.  If you do not have a doctor we will make an appointment for you.     You were seen and evaluated the emergency department for your symptoms.  Your results showed an elevated blood sugar with an A1c of 10.  You have been offered inpatient versus outpatient evaluation.  Your information has been sent to our  and you should be you should be receiving a phone call to help establish care with an endocrinologist.    Please follow with your primary care doctor within 2 days to discuss your visit here today including your diabetes and hypertension.    Please take the prescribed metformin 1 time a day as directed.    Please follow up with endocrinology clinic at your scheduled appointment. You should receive a phone call with your appointment date at time in the next 1-2 days. If you do not receive a phone call please contact the number below to confirm your appointment details.    Please return to the ED for any new or concerning symptoms including but not limited to fever, chills, dizziness, headache, chest pain, abdominal pain, nausea, vomiting, diarrhea..

## 2024-05-08 NOTE — ED PROVIDER NOTE - PATIENT PORTAL LINK FT
You can access the FollowMyHealth Patient Portal offered by [facility] by registering at the following website: [email or web address]. By joining [facility]’s FollowMyHealth portal, you will also be able to view your health information using other applications (apps) compatible with our system.

## 2024-05-08 NOTE — ED ADULT NURSE REASSESSMENT NOTE - NS ED NURSE REASSESS COMMENT FT1
Pt A&OX4, received resting comfortably in bed with no signs of acute distress respiration even and unlabored. Charting as noted. On cardiac monitor with NSR. Awaiting lab results. Safety maintained.

## 2024-05-08 NOTE — ED PROVIDER NOTE - WR ORDER DATE AND TIME 1
ROS: Constitutional- -fever, -chills.  Respiratory- -cough, -SOB  Cardiac- no chest pain, no palpitations, ENT- -rhinorrhea, no sore throat, no congestion.  Abdomen- No nausea, no vomiting, no diarrhea.  Urinary- no dysuria, no urgency, no frequency.  Skin- No rashes
08-May-2024 07:48

## 2024-05-08 NOTE — ED PROVIDER NOTE - CLINICAL SUMMARY MEDICAL DECISION MAKING FREE TEXT BOX
HPI: 48-year-old male past medical history of diabetes, hypertension, noncompliant with medications and has not seen his primary care doctor last 2 years presents complaining for multiple complaints.  Patient endorsing 1 year of chest pressure, and fluctuating blood pressures worse last night.  Denies any headache or vision changes.  States he was told that he has a upper left dental carry requiring extraction and was concerned about swelling in his face.  No other current complaints this time.    ROS:   General: No fever, no chills, no malaise, no fatigue  ENT: No earache, no coryza, no sore throat, see HPI  Neck: No stiffness, no swollen glands, no dysphagia  Respiratory: No cough, no dyspnea, no pleuritic chest pain  Cardiac: no current chest pain (see HPI), no palpitations, no edema, no jvd  Abdomen: No abdominal pain, no nausea, no vomiting, no diarrhea  : No dysuria, no increase frequency, no urgency, No discharge  Musculoskeletal: No myalgia, no arthralgia  Neurologic: No headache, no vertigo, no paresthesia, no focal deficits, no diplopia  Skin: No rash, no evidence of trauma  All other ROS are negative    PE:  General: NAD; well appearing; A&O x3   Head: NC/AT  Eyes: PERRL, EOMI  ENT: Airway patent, mmm, tooth 16 dental caries, otherwise oral cavity and pharynx normal. No inflammation, swelling, exudate, or lesions. Neck: Neck supple, non-tender without lymphadenopathy, no masses.  No submandibular or sublingual edema or brawniness.  Pulmonary: CTA b/l, symmetric breath sounds. No W/R/R.  Cardiac: s1s2, RRR, no M,G,R, No JVD  Back: Normal  spine  Extremities: FROM, symmetric pulses, capillary refill < 2 seconds, no edema, 5/5 strength in b/l UE and LE  Skin: no rash or bruising  Neurologic: alert, speech clear, no focal deficits  Psych: nl mood/affect, nl insight.    MDM: 48-year-old male past medical history of diabetes, hypertension, noncompliant with medications and has not seen his primary care doctor last 2 years presents complaining for multiple complaints.  Patient blood pressure 153/95 on arrival.  Fingerstick 300s.  Patient nontoxic-appearing.  Low suspicion DKA/HHS.  Will obtain CBC, CMP, VBG, acetone.  Low suspicion ACS, obtain TNI, CXR.  Control.,  Likely outpatient follow-up.

## 2024-05-08 NOTE — ED ADULT NURSE NOTE - OBJECTIVE STATEMENT
Pt presents to ED for multiple medical complaints. Pt reports mild chest pain 1/10 pain, high blood pressure not on meds, high blood sugar but not controlled. Pt also reports going to dentist today for toothache and he they told him he has root canal that is infected. Reports swelling to face. Airway patent. Pt reports not seeing a doctor in a couple years. Denies SOB, fever, and dysuria. Pt NSR on cardiac monitor. ED MD evaluating, plan on going.

## 2024-05-08 NOTE — ED ADULT NURSE NOTE - NSFALLUNIVINTERV_ED_ALL_ED
Bed/Stretcher in lowest position, wheels locked, appropriate side rails in place/Call bell, personal items and telephone in reach/Instruct patient to call for assistance before getting out of bed/chair/stretcher/Non-slip footwear applied when patient is off stretcher/[place] to call system/Physically safe environment - no spills, clutter or unnecessary equipment/Purposeful proactive rounding/Room/bathroom lighting operational, light cord in reach

## 2024-05-11 DIAGNOSIS — E11.9 TYPE 2 DIABETES MELLITUS WITHOUT COMPLICATIONS: ICD-10-CM

## 2024-05-11 DIAGNOSIS — I10 ESSENTIAL (PRIMARY) HYPERTENSION: ICD-10-CM

## 2024-05-11 DIAGNOSIS — R07.89 OTHER CHEST PAIN: ICD-10-CM

## 2024-05-11 DIAGNOSIS — Z91.148 PATIENT'S OTHER NONCOMPLIANCE WITH MEDICATION REGIMEN FOR OTHER REASON: ICD-10-CM

## 2024-06-05 ENCOUNTER — OFFICE (OUTPATIENT)
Dept: URBAN - METROPOLITAN AREA CLINIC 112 | Facility: CLINIC | Age: 49
Setting detail: OPHTHALMOLOGY
End: 2024-06-05
Payer: MEDICAID

## 2024-06-05 DIAGNOSIS — H52.4: ICD-10-CM

## 2024-06-05 DIAGNOSIS — H52.13: ICD-10-CM

## 2024-06-05 DIAGNOSIS — H43.393: ICD-10-CM

## 2024-06-05 PROCEDURE — 92014 COMPRE OPH EXAM EST PT 1/>: CPT | Performed by: OPHTHALMOLOGY

## 2024-06-05 PROCEDURE — 92015 DETERMINE REFRACTIVE STATE: CPT | Performed by: OPHTHALMOLOGY

## 2024-06-05 ASSESSMENT — CONFRONTATIONAL VISUAL FIELD TEST (CVF)
OD_FINDINGS: FULL
OS_FINDINGS: FULL

## 2024-06-18 ENCOUNTER — OFFICE (OUTPATIENT)
Dept: URBAN - METROPOLITAN AREA CLINIC 115 | Facility: CLINIC | Age: 49
Setting detail: OPHTHALMOLOGY
End: 2024-06-18
Payer: MEDICAID

## 2024-06-18 DIAGNOSIS — H43.393: ICD-10-CM

## 2024-06-18 DIAGNOSIS — H52.13: ICD-10-CM

## 2024-06-18 PROCEDURE — 99024 POSTOP FOLLOW-UP VISIT: CPT | Performed by: OPHTHALMOLOGY

## 2024-06-18 ASSESSMENT — CONFRONTATIONAL VISUAL FIELD TEST (CVF)
OD_FINDINGS: FULL
OS_FINDINGS: FULL

## 2025-05-08 NOTE — ED ADULT NURSE NOTE - ALCOHOL USE HISTORY SINGLE SELECT
BIA. Called and spoke with patient. Patient states she takes medication every month to help with her menstrual cramps and sometimes back pain. Does not need a refill at this time. States she still has a full bottle. Informed patient to send e-advice message or call office when she needs a refill. Patient understood and agreed.    never